# Patient Record
Sex: MALE | Race: WHITE | NOT HISPANIC OR LATINO | Employment: OTHER | URBAN - METROPOLITAN AREA
[De-identification: names, ages, dates, MRNs, and addresses within clinical notes are randomized per-mention and may not be internally consistent; named-entity substitution may affect disease eponyms.]

---

## 2017-02-03 ENCOUNTER — OFFICE VISIT (OUTPATIENT)
Dept: OPHTHALMOLOGY | Facility: CLINIC | Age: 62
End: 2017-02-03
Payer: MEDICARE

## 2017-02-03 DIAGNOSIS — H04.123 DRY EYE SYNDROME, BILATERAL: ICD-10-CM

## 2017-02-03 DIAGNOSIS — H52.7 REFRACTION DISORDER: ICD-10-CM

## 2017-02-03 DIAGNOSIS — H40.1131 PRIMARY OPEN ANGLE GLAUCOMA OF BOTH EYES, MILD STAGE: Primary | ICD-10-CM

## 2017-02-03 PROCEDURE — 92015 DETERMINE REFRACTIVE STATE: CPT | Mod: S$GLB,,, | Performed by: OPHTHALMOLOGY

## 2017-02-03 PROCEDURE — 92012 INTRM OPH EXAM EST PATIENT: CPT | Mod: S$GLB,,, | Performed by: OPHTHALMOLOGY

## 2017-02-03 PROCEDURE — 99999 PR PBB SHADOW E&M-EST. PATIENT-LVL II: CPT | Mod: PBBFAC,,, | Performed by: OPHTHALMOLOGY

## 2017-02-03 RX ORDER — FLUOROMETHOLONE 1 MG/ML
1 SUSPENSION/ DROPS OPHTHALMIC 2 TIMES DAILY
Qty: 5 ML | Refills: 4 | Status: CANCELLED | OUTPATIENT
Start: 2017-02-03

## 2017-02-03 NOTE — PROGRESS NOTES
HPI     Glaucoma    Additional comments: IOP check, Timolol BID OU, FML BID OU           Comments   Pt states he's been having trouble seeing clearly when watching tv with   his glasses. Seems like there's a fog in his vision, difficult to drive.   Want to be sure he doesn't have cataracts. Still using the steroid drops   for redness and dryness and the Timolol BID OU.    RK X MANY YEARS  DRY EYES  FAM HX GLAUCOMA MOM & BRO  SLT OS 9/11/14  SLT OD 12-29-14      Timolol BID OU  Theratears x 10 Times a day OU  FML OU BID  Failed Dowell       Last edited by Chalino Cronin on 2/3/2017  2:04 PM. (History)            Assessment /Plan     For exam results, see Encounter Report.      ICD-10-CM ICD-9-CM    1. Primary open angle glaucoma of both eyes, mild stage H40.1131 365.11 Increase in IOP OD today  Stop Steroid, consider combigan in the future- If IOP still elevated        365.71    2. Dry eye syndrome, bilateral H04.123 375.15 fluorometholone 0.1% (FML) 0.1 % DrpS   3. Refraction disorder H52.7 367.9        RETURN TO CLINIC 2-3 weeks     Timolol BID OU  Theratears x 10 Times a day OU  Stop FML

## 2017-02-17 ENCOUNTER — OFFICE VISIT (OUTPATIENT)
Dept: OPHTHALMOLOGY | Facility: CLINIC | Age: 62
End: 2017-02-17
Payer: MEDICARE

## 2017-02-17 DIAGNOSIS — H40.1131 PRIMARY OPEN ANGLE GLAUCOMA OF BOTH EYES, MILD STAGE: Primary | ICD-10-CM

## 2017-02-17 DIAGNOSIS — H04.123 DRY EYE SYNDROME, BILATERAL: ICD-10-CM

## 2017-02-17 PROCEDURE — 92012 INTRM OPH EXAM EST PATIENT: CPT | Mod: S$GLB,,, | Performed by: OPHTHALMOLOGY

## 2017-02-17 PROCEDURE — 99999 PR PBB SHADOW E&M-EST. PATIENT-LVL II: CPT | Mod: PBBFAC,,, | Performed by: OPHTHALMOLOGY

## 2017-02-17 RX ORDER — BRIMONIDINE TARTRATE AND TIMOLOL MALEATE 2; 5 MG/ML; MG/ML
1 SOLUTION OPHTHALMIC EVERY 12 HOURS
Qty: 15 ML | Refills: 4 | Status: SHIPPED | OUTPATIENT
Start: 2017-02-17 | End: 2017-07-11

## 2017-02-17 NOTE — PROGRESS NOTES
HPI     Patient is here for a two week iop check, patient was on fml but has   stopped since last visit due to ny iop.       RK X MANY YEARS  DRY EYES  FAM HX GLAUCOMA MOM & BRO  SLT OS 9/11/14  SLT OD 12-29-14      Timolol BID OU  Theratears x 10 Times a day OU  FML OU BID(stopped two weeks ago)         Last edited by JOEL Gore on 2/17/2017  2:38 PM.         Assessment /Plan     For exam results, see Encounter Report.      ICD-10-CM ICD-9-CM    1. Primary open angle glaucoma of both eyes, mild stage H40.1131 365.11 brimonidine-timolol (COMBIGAN) 0.2-0.5 % Drop    IOP not within acceptable range relative to target IOP with risk of irreversible visual loss. Additional treatment required.  Discussed options, risks, and benefits of additional medication, SLT laser, or incisional glaucoma surgery.     recommend stop timolol, begin combigan bid ou    Patient chooses above    Reviewed importance of continued compliance with treatment and follow up.        365.71    2. Dry eye syndrome, bilateral H04.123 375.15      D/c timolol  Start combigan bid ou  Return to clinic 4 weeks with iop check. combigan trial

## 2017-03-17 ENCOUNTER — OFFICE VISIT (OUTPATIENT)
Dept: OPHTHALMOLOGY | Facility: CLINIC | Age: 62
End: 2017-03-17
Payer: MEDICARE

## 2017-03-17 DIAGNOSIS — H04.123 DRY EYE SYNDROME, BILATERAL: ICD-10-CM

## 2017-03-17 DIAGNOSIS — H10.403 CHRONIC CONJUNCTIVITIS OF BOTH EYES, UNSPECIFIED CHRONIC CONJUNCTIVITIS TYPE: ICD-10-CM

## 2017-03-17 DIAGNOSIS — H40.1131 PRIMARY OPEN ANGLE GLAUCOMA OF BOTH EYES, MILD STAGE: Primary | ICD-10-CM

## 2017-03-17 PROCEDURE — 99999 PR PBB SHADOW E&M-EST. PATIENT-LVL II: CPT | Mod: PBBFAC,,, | Performed by: OPHTHALMOLOGY

## 2017-03-17 PROCEDURE — 92012 INTRM OPH EXAM EST PATIENT: CPT | Mod: S$GLB,,, | Performed by: OPHTHALMOLOGY

## 2017-03-17 RX ORDER — LATANOPROST 50 UG/ML
1 SOLUTION/ DROPS OPHTHALMIC DAILY
Qty: 1 BOTTLE | Refills: 6 | Status: SHIPPED | OUTPATIENT
Start: 2017-03-17

## 2017-03-17 NOTE — PROGRESS NOTES
HPI     Glaucoma    Additional comments: Combigan BID OU, FML PRN           Comments   Patient states new drop is fine. Has allergy eyes - OD/OS. Using gtts as   directed.        RK X MANY YEARS  DRY EYES  FAM HX GLAUCOMA MOM & BRO  SLT OS 9/11/14  SLT OD 12-29-14      Combigan BID OU  Theratears x 10 Times a day OU  FML PRN       Last edited by Thuy Frank MA on 3/17/2017  2:58 PM. (History)            Assessment /Plan     For exam results, see Encounter Report.      ICD-10-CM ICD-9-CM    1. Primary open angle glaucoma of both eyes, mild stage H40.1131 365.11 iop elevated od. Try latanoprost.      365.71    2. Dry eye syndrome, bilateral H04.123 375.15    3. Allergic conjunctivitis- d/c fml..try pazeo qd ou    Latanoprost qd od  combigan bid os  pazeo qd ou  Return to clinic 3-4 wk with iop check.

## 2017-04-18 ENCOUNTER — OFFICE VISIT (OUTPATIENT)
Dept: OPHTHALMOLOGY | Facility: CLINIC | Age: 62
End: 2017-04-18
Payer: MEDICARE

## 2017-04-18 DIAGNOSIS — H04.123 DRY EYE SYNDROME, BILATERAL: ICD-10-CM

## 2017-04-18 DIAGNOSIS — H10.13 ALLERGIC CONJUNCTIVITIS AND RHINITIS, BILATERAL: ICD-10-CM

## 2017-04-18 DIAGNOSIS — Z98.890 HISTORY OF REFRACTIVE SURGERY: ICD-10-CM

## 2017-04-18 DIAGNOSIS — H40.1131 PRIMARY OPEN ANGLE GLAUCOMA OF BOTH EYES, MILD STAGE: Primary | ICD-10-CM

## 2017-04-18 DIAGNOSIS — J30.9 ALLERGIC CONJUNCTIVITIS AND RHINITIS, BILATERAL: ICD-10-CM

## 2017-04-18 PROCEDURE — 92012 INTRM OPH EXAM EST PATIENT: CPT | Mod: S$GLB,,, | Performed by: OPHTHALMOLOGY

## 2017-04-18 PROCEDURE — 99999 PR PBB SHADOW E&M-EST. PATIENT-LVL II: CPT | Mod: PBBFAC,,, | Performed by: OPHTHALMOLOGY

## 2017-04-18 NOTE — PROGRESS NOTES
HPI     RK X MANY YEARS  DRY EYES  FAM HX GLAUCOMA MOM & BRO  SLT OS 9/11/14  SLT OD 12-29-14    Latanoprost QHS OD  Combigan BID OS  Theratears x 10 Times a day OU  FML PRN (lately OU 2-4 times daily)       Last edited by Wilberto Jackson MD on 4/18/2017  2:36 PM.         Assessment /Plan     For exam results, see Encounter Report.      ICD-10-CM ICD-9-CM    1. Primary open angle glaucoma of both eyes, mild stage H40.1131 365.11 Did not respond to latanoprost or Combigan alone OD  Doing well on Combigan bid OS      365.71    2. History of refractive surgery Z98.890 V45.69    3. Dry eye syndrome, bilateral H04.123 375.15 used FML over the few days   4. Allergic conjunctivitis and rhinitis, bilateral H10.13 372.05 As above    J30.9 477.9        Trial combigan bid ou  Latanoprost qd OD  Pazeo and FML as needed  Return to clinic 1 month

## 2017-05-16 DIAGNOSIS — H04.123 DRY EYE SYNDROME, BILATERAL: ICD-10-CM

## 2017-05-16 RX ORDER — FLUOROMETHOLONE 1 MG/ML
1 SUSPENSION/ DROPS OPHTHALMIC 2 TIMES DAILY
Qty: 5 ML | Refills: 2 | Status: SHIPPED | OUTPATIENT
Start: 2017-05-16 | End: 2018-07-03 | Stop reason: SDUPTHER

## 2017-05-23 ENCOUNTER — OFFICE VISIT (OUTPATIENT)
Dept: OPHTHALMOLOGY | Facility: CLINIC | Age: 62
End: 2017-05-23
Payer: MEDICARE

## 2017-05-23 DIAGNOSIS — Z98.890 HISTORY OF REFRACTIVE SURGERY: ICD-10-CM

## 2017-05-23 DIAGNOSIS — H10.13 ALLERGIC CONJUNCTIVITIS AND RHINITIS, BILATERAL: ICD-10-CM

## 2017-05-23 DIAGNOSIS — H40.1131 PRIMARY OPEN ANGLE GLAUCOMA OF BOTH EYES, MILD STAGE: Primary | ICD-10-CM

## 2017-05-23 DIAGNOSIS — J30.9 ALLERGIC CONJUNCTIVITIS AND RHINITIS, BILATERAL: ICD-10-CM

## 2017-05-23 DIAGNOSIS — H04.123 DRY EYE SYNDROME, BILATERAL: ICD-10-CM

## 2017-05-23 PROCEDURE — 99999 PR PBB SHADOW E&M-EST. PATIENT-LVL II: CPT | Mod: PBBFAC,,, | Performed by: OPHTHALMOLOGY

## 2017-05-23 PROCEDURE — 92012 INTRM OPH EXAM EST PATIENT: CPT | Mod: S$GLB,,, | Performed by: OPHTHALMOLOGY

## 2017-05-23 NOTE — PROGRESS NOTES
HPI     Here for IOP check. Describes black spot in visual field. Uncertain which   eye. No flashes.     RK X MANY YEARS  DRY EYES  FAM HX GLAUCOMA MOM & BRO  SLT OS 9/11/14  SLT OD 12-29-14    Latanoprost QHS OD  Combigan BID OS  Theratears x 10 Times a day OU  FML PRN (lately OU 2-4 times daily)  pazeo--ran out of samples    Last edited by Wilberto Jackson MD on 5/23/2017  3:06 PM. (History)            Assessment /Plan     For exam results, see Encounter Report.      ICD-10-CM ICD-9-CM    1. Primary open angle glaucoma of both eyes, mild stage H40.1131 365.11 IOP lower OD today with dual therapy od. Will continue to adjust meds.      365.71    2. History of refractive surgery Z98.890 V45.69 well   3. Dry eye syndrome, bilateral H04.123 375.15 Happy with thera tears   4. Allergic conjunctivitis and rhinitis, bilateral H10.13 372.05 Using FML prn     J30.9 477.9              Combigan od bid  Latanoprost qhs ou  FML PRN   Thera tears     Return to clinic 6 weeks with iop check.

## 2017-07-11 ENCOUNTER — OFFICE VISIT (OUTPATIENT)
Dept: OPHTHALMOLOGY | Facility: CLINIC | Age: 62
End: 2017-07-11
Payer: MEDICARE

## 2017-07-11 DIAGNOSIS — H04.123 DRY EYE SYNDROME, BILATERAL: ICD-10-CM

## 2017-07-11 DIAGNOSIS — Z98.890 HISTORY OF REFRACTIVE SURGERY: ICD-10-CM

## 2017-07-11 DIAGNOSIS — H40.1131 PRIMARY OPEN ANGLE GLAUCOMA OF BOTH EYES, MILD STAGE: Primary | ICD-10-CM

## 2017-07-11 PROCEDURE — 92012 INTRM OPH EXAM EST PATIENT: CPT | Mod: S$GLB,,, | Performed by: OPHTHALMOLOGY

## 2017-07-11 PROCEDURE — 99999 PR PBB SHADOW E&M-EST. PATIENT-LVL II: CPT | Mod: PBBFAC,,, | Performed by: OPHTHALMOLOGY

## 2017-07-11 RX ORDER — TIMOLOL MALEATE 5 MG/ML
1 SOLUTION/ DROPS OPHTHALMIC 2 TIMES DAILY
Qty: 10 ML | Refills: 6 | Status: SHIPPED | OUTPATIENT
Start: 2017-07-11 | End: 2018-08-07 | Stop reason: SDUPTHER

## 2017-07-11 RX ORDER — BRIMONIDINE TARTRATE 2 MG/ML
1 SOLUTION/ DROPS OPHTHALMIC 2 TIMES DAILY
Qty: 5 ML | Refills: 1 | Status: SHIPPED | OUTPATIENT
Start: 2017-07-11 | End: 2018-05-24 | Stop reason: SDUPTHER

## 2017-07-11 NOTE — PROGRESS NOTES
HPI     Glaucoma    Additional comments: 6 mth iop ck. pt c/o red eyes.            Blurred Vision    Additional comments: hazy right eye. od also seems swollen. may be   allergies           Comments   RK X MANY YEARS  DRY EYES  FAM HX GLAUCOMA MOM & BRO  SLT OS 9/11/14  SLT OD 12-29-14    Latanoprost QHS OU  Combigan BID OU  Theratears x 10 Times a day OU  FML PRN (lately OU 2-4 times daily)       Last edited by Tammy Torres MA on 7/11/2017  2:03 PM. (History)            Assessment /Plan     For exam results, see Encounter Report.      ICD-10-CM ICD-9-CM    1. Primary open angle glaucoma of both eyes, mild stage H40.1131 365.11 Doing well - intraocular pressure is within acceptable range relative to target pressure with no evidence of progression.   Continue current treatment.  Reviewed importance of continued compliance with treatment and follow up.       Pt would like to try split therapy instead of combination combigan. Will try brimonidine bid ou and timolol bid ou along with  Latanoprost qhs ou.      365.71    2. History of refractive surgery Z98.890 V45.69    3. Dry eye syndrome, bilateral H04.123 375.15 Continue treatment.      D/c combigan  Begin brimonidine bid ou  Begin timolol bid ou  Latanoprost qhs ou    Pt will let us know if split therapy is cheaper, or if he would like to be put back on combination combigan.   Return to clinic 3 months with HVF, dilation, and SDP's.

## 2018-05-24 DIAGNOSIS — H40.1131 PRIMARY OPEN ANGLE GLAUCOMA OF BOTH EYES, MILD STAGE: ICD-10-CM

## 2018-05-24 RX ORDER — BRIMONIDINE TARTRATE 2 MG/ML
1 SOLUTION/ DROPS OPHTHALMIC 2 TIMES DAILY
Qty: 10 ML | Refills: 4 | Status: SHIPPED | OUTPATIENT
Start: 2018-05-24 | End: 2019-05-24

## 2018-07-03 DIAGNOSIS — H04.123 DRY EYE SYNDROME, BILATERAL: ICD-10-CM

## 2018-07-03 RX ORDER — FLUOROMETHOLONE 1 MG/ML
SUSPENSION/ DROPS OPHTHALMIC
Qty: 10 ML | Refills: 2 | Status: SHIPPED | OUTPATIENT
Start: 2018-07-03 | End: 2018-11-01 | Stop reason: SDUPTHER

## 2018-08-07 DIAGNOSIS — H40.1131 PRIMARY OPEN ANGLE GLAUCOMA OF BOTH EYES, MILD STAGE: ICD-10-CM

## 2018-08-07 RX ORDER — TIMOLOL MALEATE 5 MG/ML
1 SOLUTION/ DROPS OPHTHALMIC 2 TIMES DAILY
Qty: 10 ML | Refills: 3 | Status: SHIPPED | OUTPATIENT
Start: 2018-08-07

## 2018-11-01 DIAGNOSIS — H04.123 DRY EYE SYNDROME, BILATERAL: ICD-10-CM

## 2018-11-01 RX ORDER — FLUOROMETHOLONE 1 MG/ML
SUSPENSION/ DROPS OPHTHALMIC
Qty: 10 ML | Refills: 0 | Status: SHIPPED | OUTPATIENT
Start: 2018-11-01

## 2019-01-04 DIAGNOSIS — H04.123 DRY EYE SYNDROME, BILATERAL: ICD-10-CM

## 2019-01-04 RX ORDER — FLUOROMETHOLONE 1 MG/ML
SUSPENSION/ DROPS OPHTHALMIC
Qty: 10 ML | Refills: 0 | OUTPATIENT
Start: 2019-01-04

## 2019-01-04 NOTE — TELEPHONE ENCOUNTER
Left message for patient to call and discuss refill on FML drops,.  Per Dr. Jackson we need to have IOP checked before we can refill FML  Please call [phone] and ask for Jessie

## 2021-06-24 ENCOUNTER — TELEPHONE (OUTPATIENT)
Dept: PAIN MEDICINE | Facility: CLINIC | Age: 66
End: 2021-06-24

## 2021-07-01 ENCOUNTER — TELEPHONE (OUTPATIENT)
Dept: PAIN MEDICINE | Facility: CLINIC | Age: 66
End: 2021-07-01